# Patient Record
Sex: MALE | Race: WHITE | NOT HISPANIC OR LATINO | Employment: FULL TIME | URBAN - METROPOLITAN AREA
[De-identification: names, ages, dates, MRNs, and addresses within clinical notes are randomized per-mention and may not be internally consistent; named-entity substitution may affect disease eponyms.]

---

## 2017-01-30 ENCOUNTER — APPOINTMENT (OUTPATIENT)
Dept: LAB | Facility: CLINIC | Age: 52
End: 2017-01-30
Payer: COMMERCIAL

## 2017-01-30 ENCOUNTER — GENERIC CONVERSION - ENCOUNTER (OUTPATIENT)
Dept: OTHER | Facility: OTHER | Age: 52
End: 2017-01-30

## 2017-01-30 DIAGNOSIS — E03.9 HYPOTHYROIDISM: ICD-10-CM

## 2017-01-30 LAB — TSH SERPL DL<=0.05 MIU/L-ACNC: 3.68 UIU/ML (ref 0.36–3.74)

## 2017-01-30 PROCEDURE — 84443 ASSAY THYROID STIM HORMONE: CPT

## 2017-01-30 PROCEDURE — 36415 COLL VENOUS BLD VENIPUNCTURE: CPT

## 2018-01-11 NOTE — RESULT NOTES
Verified Results  (1) TSH [id number] 10:03AM Ileana [facility] Order Number: [id number]     Test Name Result Flag Reference   TSH 6 280 uIU/mL H 0 358-3 740   - Patient Instructions: This bloodwork is non-fasting  Please drink two glasses of water morning of bloodwork  - Patient Instructions: This bloodwork is non-fasting  Please drink two glasses of water morning of bloodwork  Patients undergoing fluorescein dye angiography may retain small amounts of fluorescein in the body for 48-72 hours post procedure  Samples containing fluorescein can produce falsely depressed TSH values  If the patient had this procedure,a specimen should be resubmitted post fluorescein clearance  Plan  Acquired hypothyroidism    · From  Levothyroxine Sodium 25 MCG Oral Tablet TAKE 1 TABLET DAILY To  Levothyroxine Sodium 50 MCG Oral Tablet TAKE 1 TABLET DAILY   · (1) TSH; Status:Active;  Requested for:29Nov2016;

## 2018-01-16 NOTE — RESULT NOTES
Verified Results  (1) TSH [id number] 11:16AM Stephanie Rizzo Order Number: [id number]     Test Name Result Flag Reference   TSH 3 680 uIU/mL  0 358-3 740   - Patient Instructions: This bloodwork is non-fasting  Please drink two glasses of water morning of bloodwork  - Patient Instructions: This bloodwork is non-fasting  Please drink two glasses of water morning of bloodwork  Patients undergoing fluorescein dye angiography may retain small amounts of fluorescein in the body for 48-72 hours post procedure  Samples containing fluorescein can produce falsely depressed TSH values  If the patient had this procedure,a specimen should be resubmitted post fluorescein clearance

## 2018-03-28 DIAGNOSIS — E03.9 HYPOTHYROIDISM, UNSPECIFIED TYPE: Primary | ICD-10-CM

## 2018-03-28 RX ORDER — LEVOTHYROXINE SODIUM 0.05 MG/1
1 TABLET ORAL DAILY
COMMUNITY
Start: 2016-10-07 | End: 2018-03-28 | Stop reason: SDUPTHER

## 2018-03-29 RX ORDER — LEVOTHYROXINE SODIUM 0.05 MG/1
50 TABLET ORAL DAILY
Qty: 30 TABLET | Refills: 5 | Status: SHIPPED | OUTPATIENT
Start: 2018-03-29 | End: 2018-08-12 | Stop reason: SDUPTHER

## 2018-05-22 ENCOUNTER — OFFICE VISIT (OUTPATIENT)
Dept: FAMILY MEDICINE CLINIC | Facility: CLINIC | Age: 53
End: 2018-05-22
Payer: COMMERCIAL

## 2018-05-22 VITALS
SYSTOLIC BLOOD PRESSURE: 112 MMHG | DIASTOLIC BLOOD PRESSURE: 74 MMHG | HEIGHT: 72 IN | RESPIRATION RATE: 16 BRPM | TEMPERATURE: 98.2 F | HEART RATE: 60 BPM | WEIGHT: 220 LBS | BODY MASS INDEX: 29.8 KG/M2

## 2018-05-22 DIAGNOSIS — R79.89 LOW TESTOSTERONE: ICD-10-CM

## 2018-05-22 DIAGNOSIS — J30.1 SEASONAL ALLERGIC RHINITIS DUE TO POLLEN: ICD-10-CM

## 2018-05-22 DIAGNOSIS — E03.9 ACQUIRED HYPOTHYROIDISM: Primary | ICD-10-CM

## 2018-05-22 DIAGNOSIS — Z13.1 SCREENING FOR DIABETES MELLITUS: ICD-10-CM

## 2018-05-22 DIAGNOSIS — Z12.11 SCREENING FOR COLON CANCER: ICD-10-CM

## 2018-05-22 DIAGNOSIS — Z12.5 SCREENING FOR PROSTATE CANCER: ICD-10-CM

## 2018-05-22 DIAGNOSIS — E03.9 HYPOTHYROIDISM, UNSPECIFIED TYPE: ICD-10-CM

## 2018-05-22 DIAGNOSIS — Z13.220 SCREENING FOR HYPERLIPIDEMIA: ICD-10-CM

## 2018-05-22 DIAGNOSIS — Z13.0 SCREENING FOR DEFICIENCY ANEMIA: ICD-10-CM

## 2018-05-22 DIAGNOSIS — K58.2 IRRITABLE BOWEL SYNDROME WITH BOTH CONSTIPATION AND DIARRHEA: ICD-10-CM

## 2018-05-22 PROCEDURE — 3008F BODY MASS INDEX DOCD: CPT | Performed by: INTERNAL MEDICINE

## 2018-05-22 PROCEDURE — 1036F TOBACCO NON-USER: CPT | Performed by: INTERNAL MEDICINE

## 2018-05-22 PROCEDURE — 99214 OFFICE O/P EST MOD 30 MIN: CPT | Performed by: INTERNAL MEDICINE

## 2018-05-22 NOTE — PROGRESS NOTES
Assessment/Plan:       Diagnoses and all orders for this visit:    Acquired hypothyroidism    Low testosterone    Irritable bowel syndrome with both constipation and diarrhea    Seasonal allergic rhinitis due to pollen    Screening for deficiency anemia  -     CBC and differential; Future    Screening for diabetes mellitus  -     Comprehensive metabolic panel; Future    Screening for hyperlipidemia  -     Lipid panel; Future    Hypothyroidism, unspecified type  -     TSH, 3rd generation with T4 reflex; Future    Screening for prostate cancer  -     PSA, total and free; Future    Screening for colon cancer  -     Ambulatory referral to Gastroenterology; Future        No problem-specific Assessment & Plan notes found for this encounter  We will followup in 6 months    Subjective:      Patient ID: Viraj Tinoco is a 48 y o  male  The patient was seen and examined and noted to have issues with IBS with mixed symptoms  He states that he has no tenesmus type symptoms and notes no issues with food intolerance, but states that he has alternating constipation and diarrhea  The patient states that he has no issues with milk/dairy  There are no other issues at this time  We do need to follow up with a colonoscopy  The patient also needs a follow up on his TSH  He notes no issues with allergic rhinitis and he is only taking an episodic antihistamine  The following portions of the patient's history were reviewed and updated as appropriate:   He has a past medical history of Chronic calculous cholecystitis  ,   does not have any pertinent problems on file  ,   has a past surgical history that includes CHOLECYSTECTOMY LAPAROSCOPIC (10/23/2014); Hernia repair; Vasectomy; and Tonsillectomy  ,  family history includes Colon cancer in his father; Hyperlipidemia in his father; No Known Problems in his mother  ,   reports that he has never smoked   He has never used smokeless tobacco  He reports that he does not drink alcohol or use drugs  ,  is allergic to tramadol     Current Outpatient Prescriptions   Medication Sig Dispense Refill    levothyroxine 50 mcg tablet Take 1 tablet (50 mcg total) by mouth daily 30 tablet 5     No current facility-administered medications for this visit  Review of Systems   Constitutional: Negative for chills and fever  HENT: Negative for ear pain, sinus pain and sinus pressure  Eyes: Negative for pain  Respiratory: Negative for choking and shortness of breath  Cardiovascular: Negative for chest pain and leg swelling  Gastrointestinal: Positive for constipation and diarrhea  Negative for abdominal distention, abdominal pain, blood in stool, nausea and vomiting  Genitourinary: Negative  Musculoskeletal: Negative  Neurological: Negative  Psychiatric/Behavioral: Negative  Objective:  Vitals:    05/22/18 1315   BP: 112/74   BP Location: Left arm   Patient Position: Sitting   Cuff Size: Large   Pulse: 60   Resp: 16   Temp: 98 2 °F (36 8 °C)   TempSrc: Tympanic   Weight: 99 8 kg (220 lb)   Height: 5' 11 5" (1 816 m)     Body mass index is 30 26 kg/m²  Physical Exam   Constitutional: He is oriented to person, place, and time  He appears well-developed and well-nourished  HENT:   Head: Normocephalic and atraumatic  Eyes: Conjunctivae and EOM are normal  Pupils are equal, round, and reactive to light  Neck: Normal range of motion  Neck supple  Cardiovascular: Normal rate, regular rhythm, normal heart sounds and intact distal pulses  Pulmonary/Chest: Effort normal and breath sounds normal    Abdominal: Soft  Bowel sounds are normal    Musculoskeletal: Normal range of motion  Neurological: He is alert and oriented to person, place, and time  He has normal reflexes  Skin: Skin is warm and dry  Psychiatric: He has a normal mood and affect

## 2018-08-10 ENCOUNTER — TELEPHONE (OUTPATIENT)
Dept: INTERNAL MEDICINE CLINIC | Facility: CLINIC | Age: 53
End: 2018-08-10

## 2018-08-12 DIAGNOSIS — E03.9 HYPOTHYROIDISM, UNSPECIFIED TYPE: ICD-10-CM

## 2018-08-12 RX ORDER — LEVOTHYROXINE SODIUM 0.07 MG/1
75 TABLET ORAL DAILY
Qty: 30 TABLET | Refills: 1 | Status: SHIPPED | OUTPATIENT
Start: 2018-08-12 | End: 2018-08-14 | Stop reason: SDUPTHER

## 2018-08-13 NOTE — TELEPHONE ENCOUNTER
PT called and would like to know what you are concerned about with his labs  He would like to talk with you and see if you can go over this on the phone    Stated he live 1/1/2 hrs away    But if you really need him to come in he will    Please advise    Phone Cell [phone]

## 2018-08-14 DIAGNOSIS — E03.9 HYPOTHYROIDISM, UNSPECIFIED TYPE: ICD-10-CM

## 2018-08-14 DIAGNOSIS — R79.89 ABNORMAL THYROID BLOOD TEST: Primary | ICD-10-CM

## 2018-08-14 NOTE — TELEPHONE ENCOUNTER
TSH is elevated and we need to increase the Levothyroxine  Where can we send that to  He will need to follow up with a TSH in 6 weeks  All other issues schedule follow up at patient convenience

## 2018-08-23 RX ORDER — LEVOTHYROXINE SODIUM 0.1 MG/1
100 TABLET ORAL DAILY
Qty: 30 TABLET | Refills: 1 | Status: SHIPPED | OUTPATIENT
Start: 2018-08-23 | End: 2019-02-25 | Stop reason: SDUPTHER

## 2018-10-20 DIAGNOSIS — E03.9 HYPOTHYROIDISM, UNSPECIFIED TYPE: ICD-10-CM

## 2018-10-20 LAB — TSH SERPL-ACNC: 2.29 MIU/L (ref 0.4–4.5)

## 2018-10-22 RX ORDER — LEVOTHYROXINE SODIUM 0.07 MG/1
TABLET ORAL
Qty: 30 TABLET | Refills: 1 | Status: SHIPPED | OUTPATIENT
Start: 2018-10-22 | End: 2018-12-21 | Stop reason: SDUPTHER

## 2018-12-21 DIAGNOSIS — E03.9 HYPOTHYROIDISM, UNSPECIFIED TYPE: ICD-10-CM

## 2018-12-21 RX ORDER — LEVOTHYROXINE SODIUM 0.07 MG/1
TABLET ORAL
Qty: 30 TABLET | Refills: 1 | Status: SHIPPED | OUTPATIENT
Start: 2018-12-21 | End: 2019-02-28 | Stop reason: SDUPTHER

## 2019-02-25 DIAGNOSIS — E03.9 HYPOTHYROIDISM, UNSPECIFIED TYPE: ICD-10-CM

## 2019-02-26 RX ORDER — LEVOTHYROXINE SODIUM 0.1 MG/1
100 TABLET ORAL DAILY
Qty: 30 TABLET | Refills: 1 | Status: SHIPPED | OUTPATIENT
Start: 2019-02-26 | End: 2019-02-28

## 2019-02-28 DIAGNOSIS — E03.9 HYPOTHYROIDISM, UNSPECIFIED TYPE: ICD-10-CM

## 2019-02-28 RX ORDER — LEVOTHYROXINE SODIUM 0.07 MG/1
75 TABLET ORAL DAILY
Qty: 30 TABLET | Refills: 5 | Status: SHIPPED | OUTPATIENT
Start: 2019-02-28 | End: 2019-03-04 | Stop reason: SDUPTHER

## 2019-03-04 DIAGNOSIS — E03.9 HYPOTHYROIDISM, UNSPECIFIED TYPE: ICD-10-CM

## 2019-03-04 RX ORDER — LEVOTHYROXINE SODIUM 0.07 MG/1
75 TABLET ORAL DAILY
Qty: 90 TABLET | Refills: 1 | Status: SHIPPED | OUTPATIENT
Start: 2019-03-04 | End: 2020-03-03 | Stop reason: SDUPTHER

## 2019-07-19 LAB — TSH SERPL-ACNC: 2.47 MIU/L (ref 0.4–4.5)

## 2019-07-30 ENCOUNTER — OFFICE VISIT (OUTPATIENT)
Dept: INTERNAL MEDICINE CLINIC | Facility: CLINIC | Age: 54
End: 2019-07-30
Payer: COMMERCIAL

## 2019-07-30 VITALS
BODY MASS INDEX: 30.99 KG/M2 | SYSTOLIC BLOOD PRESSURE: 118 MMHG | DIASTOLIC BLOOD PRESSURE: 76 MMHG | HEART RATE: 98 BPM | OXYGEN SATURATION: 96 % | WEIGHT: 221.38 LBS | TEMPERATURE: 98.7 F | HEIGHT: 71 IN

## 2019-07-30 DIAGNOSIS — E03.9 ACQUIRED HYPOTHYROIDISM: Primary | ICD-10-CM

## 2019-07-30 DIAGNOSIS — Z13.0 SCREENING FOR DEFICIENCY ANEMIA: ICD-10-CM

## 2019-07-30 DIAGNOSIS — F32.1 CURRENT MODERATE EPISODE OF MAJOR DEPRESSIVE DISORDER WITHOUT PRIOR EPISODE (HCC): ICD-10-CM

## 2019-07-30 DIAGNOSIS — Z13.220 SCREENING FOR HYPERLIPIDEMIA: ICD-10-CM

## 2019-07-30 DIAGNOSIS — Z11.59 NEED FOR HEPATITIS C SCREENING TEST: ICD-10-CM

## 2019-07-30 DIAGNOSIS — Z13.1 SCREENING FOR DIABETES MELLITUS: ICD-10-CM

## 2019-07-30 PROBLEM — F32.9 CURRENT EPISODE OF MAJOR DEPRESSIVE DISORDER WITHOUT PRIOR EPISODE: Status: ACTIVE | Noted: 2019-07-30

## 2019-07-30 PROCEDURE — 3008F BODY MASS INDEX DOCD: CPT | Performed by: INTERNAL MEDICINE

## 2019-07-30 PROCEDURE — 99214 OFFICE O/P EST MOD 30 MIN: CPT | Performed by: INTERNAL MEDICINE

## 2019-07-30 RX ORDER — SERTRALINE HYDROCHLORIDE 25 MG/1
25 TABLET, FILM COATED ORAL DAILY
Qty: 30 TABLET | Refills: 5 | Status: SHIPPED | OUTPATIENT
Start: 2019-07-30 | End: 2019-09-10

## 2019-07-30 NOTE — PROGRESS NOTES
Assessment/Plan:       Diagnoses and all orders for this visit:    Acquired hypothyroidism  -     TSH, 3rd generation    Screening for hyperlipidemia  -     Lipid Panel with Direct LDL reflex    Screening for diabetes mellitus  -     Comprehensive metabolic panel    Screening for deficiency anemia  -     CBC and differential    Need for hepatitis C screening test  -     Hepatitis C antibody    Current moderate episode of major depressive disorder without prior episode (HCC)  -     sertraline (ZOLOFT) 25 mg tablet; Take 1 tablet (25 mg total) by mouth daily        No problem-specific Assessment & Plan notes found for this encounter  We will follow up in one month    Subjective:      Patient ID: Nilda Mauro is a 47 y o  male  The patient notes issues with down mood and irritability  He notes loss of interest and anhedonias  The patient states that he has issues with maintaining sleep  Depression   This is a chronic problem  The current episode started more than 1 month ago  The problem occurs constantly  The problem has been waxing and waning  Pertinent negatives include no abdominal pain, anorexia, arthralgias, change in bowel habit, chest pain, chills, congestion, coughing, diaphoresis, fatigue, fever, headaches, joint swelling, myalgias, nausea, neck pain, numbness, rash, sore throat, swollen glands, urinary symptoms, vertigo, visual change, vomiting or weakness  The symptoms are aggravated by stress  He has tried nothing for the symptoms  The following portions of the patient's history were reviewed and updated as appropriate:   He has a past medical history of Chronic calculous cholecystitis  ,  does not have any pertinent problems on file  ,   has a past surgical history that includes CHOLECYSTECTOMY LAPAROSCOPIC (10/23/2014); Hernia repair; Vasectomy; and Tonsillectomy  ,  family history includes Colon cancer in his father; Hyperlipidemia in his father; No Known Problems in his mother  , reports that he has never smoked  He has never used smokeless tobacco  He reports that he does not drink alcohol or use drugs  ,  is allergic to tramadol     Current Outpatient Medications   Medication Sig Dispense Refill    levothyroxine 75 mcg tablet Take 1 tablet (75 mcg total) by mouth daily 90 tablet 1    sertraline (ZOLOFT) 25 mg tablet Take 1 tablet (25 mg total) by mouth daily 30 tablet 5     No current facility-administered medications for this visit  Review of Systems   Constitutional: Negative for chills, diaphoresis, fatigue and fever  HENT: Negative for congestion and sore throat  Respiratory: Negative for cough  Cardiovascular: Negative for chest pain  Gastrointestinal: Negative for abdominal pain, anorexia, change in bowel habit, nausea and vomiting  Musculoskeletal: Negative for arthralgias, joint swelling, myalgias and neck pain  Skin: Negative for rash  Neurological: Negative for vertigo, weakness, numbness and headaches  Psychiatric/Behavioral: Positive for depression  Objective:  Vitals:    07/30/19 0908   BP: 118/76   Pulse: 98   Temp: 98 7 °F (37 1 °C)   SpO2: 96%   Weight: 100 kg (221 lb 6 oz)   Height: 5' 11" (1 803 m)     Body mass index is 30 88 kg/m²  Physical Exam   Constitutional: He is oriented to person, place, and time  He appears well-developed and well-nourished  HENT:   Head: Normocephalic and atraumatic  Eyes: Pupils are equal, round, and reactive to light  Neck: Normal range of motion  Cardiovascular: Normal rate, regular rhythm, normal heart sounds and intact distal pulses  Exam reveals no gallop  No murmur heard  Pulmonary/Chest: Effort normal and breath sounds normal  He has no wheezes  He has no rales  Abdominal: Soft  Bowel sounds are normal  He exhibits no mass  There is no tenderness  There is no rebound  Musculoskeletal: Normal range of motion  He exhibits no edema or tenderness     Neurological: He is alert and oriented to person, place, and time  Coordination normal    Skin: Skin is warm and dry  Psychiatric: He has a normal mood and affect  His speech is normal and behavior is normal  Judgment and thought content normal  Cognition and memory are normal    Nursing note and vitals reviewed

## 2019-09-04 LAB
ALBUMIN SERPL-MCNC: 4 G/DL (ref 3.6–5.1)
ALBUMIN/GLOB SERPL: 1.6 (CALC) (ref 1–2.5)
ALP SERPL-CCNC: 57 U/L (ref 40–115)
ALT SERPL-CCNC: 24 U/L (ref 9–46)
AST SERPL-CCNC: 20 U/L (ref 10–35)
BASOPHILS # BLD AUTO: 69 CELLS/UL (ref 0–200)
BASOPHILS NFR BLD AUTO: 1.6 %
BILIRUB SERPL-MCNC: 0.5 MG/DL (ref 0.2–1.2)
BUN SERPL-MCNC: 27 MG/DL (ref 7–25)
BUN/CREAT SERPL: 21 (CALC) (ref 6–22)
CALCIUM SERPL-MCNC: 8.8 MG/DL (ref 8.6–10.3)
CHLORIDE SERPL-SCNC: 104 MMOL/L (ref 98–110)
CHOLEST SERPL-MCNC: 212 MG/DL
CHOLEST/HDLC SERPL: 4.6 (CALC)
CO2 SERPL-SCNC: 27 MMOL/L (ref 20–32)
CREAT SERPL-MCNC: 1.26 MG/DL (ref 0.7–1.33)
EOSINOPHIL # BLD AUTO: 159 CELLS/UL (ref 15–500)
EOSINOPHIL NFR BLD AUTO: 3.7 %
ERYTHROCYTE [DISTWIDTH] IN BLOOD BY AUTOMATED COUNT: 12.3 % (ref 11–15)
GLOBULIN SER CALC-MCNC: 2.5 G/DL (CALC) (ref 1.9–3.7)
GLUCOSE SERPL-MCNC: 91 MG/DL (ref 65–99)
HCT VFR BLD AUTO: 43.6 % (ref 38.5–50)
HCV AB S/CO SERPL IA: 0.01
HCV AB SERPL QL IA: NORMAL
HDLC SERPL-MCNC: 46 MG/DL
HGB BLD-MCNC: 14.3 G/DL (ref 13.2–17.1)
LDLC SERPL CALC-MCNC: 146 MG/DL (CALC)
LYMPHOCYTES # BLD AUTO: 1445 CELLS/UL (ref 850–3900)
LYMPHOCYTES NFR BLD AUTO: 33.6 %
MCH RBC QN AUTO: 27.8 PG (ref 27–33)
MCHC RBC AUTO-ENTMCNC: 32.8 G/DL (ref 32–36)
MCV RBC AUTO: 84.8 FL (ref 80–100)
MONOCYTES # BLD AUTO: 546 CELLS/UL (ref 200–950)
MONOCYTES NFR BLD AUTO: 12.7 %
NEUTROPHILS # BLD AUTO: 2081 CELLS/UL (ref 1500–7800)
NEUTROPHILS NFR BLD AUTO: 48.4 %
NONHDLC SERPL-MCNC: 166 MG/DL (CALC)
PLATELET # BLD AUTO: 154 THOUSAND/UL (ref 140–400)
PMV BLD REES-ECKER: 10.6 FL (ref 7.5–12.5)
POTASSIUM SERPL-SCNC: 4.2 MMOL/L (ref 3.5–5.3)
PROT SERPL-MCNC: 6.5 G/DL (ref 6.1–8.1)
RBC # BLD AUTO: 5.14 MILLION/UL (ref 4.2–5.8)
SL AMB EGFR AFRICAN AMERICAN: 74 ML/MIN/1.73M2
SL AMB EGFR NON AFRICAN AMERICAN: 64 ML/MIN/1.73M2
SODIUM SERPL-SCNC: 137 MMOL/L (ref 135–146)
TRIGL SERPL-MCNC: 94 MG/DL
TSH SERPL-ACNC: 4.04 MIU/L (ref 0.4–4.5)
WBC # BLD AUTO: 4.3 THOUSAND/UL (ref 3.8–10.8)

## 2019-09-10 ENCOUNTER — OFFICE VISIT (OUTPATIENT)
Dept: INTERNAL MEDICINE CLINIC | Facility: CLINIC | Age: 54
End: 2019-09-10
Payer: COMMERCIAL

## 2019-09-10 VITALS
OXYGEN SATURATION: 96 % | BODY MASS INDEX: 31.25 KG/M2 | DIASTOLIC BLOOD PRESSURE: 82 MMHG | HEIGHT: 71 IN | SYSTOLIC BLOOD PRESSURE: 124 MMHG | WEIGHT: 223.2 LBS | HEART RATE: 65 BPM | TEMPERATURE: 97.9 F | RESPIRATION RATE: 16 BRPM

## 2019-09-10 DIAGNOSIS — E03.9 ACQUIRED HYPOTHYROIDISM: ICD-10-CM

## 2019-09-10 DIAGNOSIS — F32.0 CURRENT MILD EPISODE OF MAJOR DEPRESSIVE DISORDER WITHOUT PRIOR EPISODE (HCC): ICD-10-CM

## 2019-09-10 DIAGNOSIS — E78.00 HYPERCHOLESTEROLEMIA: Primary | ICD-10-CM

## 2019-09-10 PROCEDURE — 99214 OFFICE O/P EST MOD 30 MIN: CPT | Performed by: INTERNAL MEDICINE

## 2019-09-10 PROCEDURE — 3008F BODY MASS INDEX DOCD: CPT | Performed by: INTERNAL MEDICINE

## 2019-09-10 NOTE — PROGRESS NOTES
Assessment/Plan:    Problem List Items Addressed This Visit        Endocrine    Acquired hypothyroidism       Other    Current episode of major depressive disorder without prior episode      Other Visit Diagnoses     Hypercholesterolemia    -  Primary    Relevant Orders    LDL cholesterol, direct           Diagnoses and all orders for this visit:    Hypercholesterolemia  -     LDL cholesterol, direct; Future    Acquired hypothyroidism    Current mild episode of major depressive disorder without prior episode ([address] )        No problem-specific Assessment & Plan notes found for this encounter  Subjective:      Patient ID: Gabe Hooper is a 47 y o  male  We reviewed the patient's labs and noted that his LDL cholesterol was noted to be elevated  The patient states that he is on the Atkins diet  I noted that a diet that was low in animal products and aerobic exercise might help this  The patient states that he would try some changes and he would follow up on the LDL in 3-4 months  I noted that he could also try a low Red Yeast Rice Extract  The patient notes that he is resistant to taking the Sertraline for his mood and would like to try something more Homeopathic  I noted that he could try [employer] and exercise  If needed we could revisit the Sertraline if felt this had failed  The following portions of the patient's history were reviewed and updated as appropriate:   He has a past medical history of Chronic calculous cholecystitis and Depression  ,  does not have any pertinent problems on file  ,   has a past surgical history that includes CHOLECYSTECTOMY LAPAROSCOPIC (10/23/2014); Hernia repair; Vasectomy; and Tonsillectomy  ,  family history includes Colon cancer in his father; Hyperlipidemia in his father; No Known Problems in his mother  ,   reports that he has never smoked  He has never used smokeless tobacco  He reports that he does not drink alcohol or use drugs  ,  is allergic to tramadol     Current Outpatient Medications   Medication Sig Dispense Refill    levothyroxine 75 mcg tablet Take 1 tablet (75 mcg total) by mouth daily 90 tablet 1     No current facility-administered medications for this visit  Review of Systems   Constitutional: Negative for chills, fatigue and fever  HENT: Negative  Respiratory: Negative for cough, chest tightness and shortness of breath  Cardiovascular: Negative for chest pain and palpitations  Gastrointestinal: Negative for abdominal pain, constipation, diarrhea, nausea and vomiting  Genitourinary: Negative  Musculoskeletal: Negative for back pain and myalgias  Skin: Negative  Neurological: Negative  Psychiatric/Behavioral: Negative for dysphoric mood  The patient is not nervous/anxious  Objective:  Vitals:    09/10/19 1425   BP: 124/82   BP Location: Left arm   Patient Position: Sitting   Cuff Size: Large   Pulse: 65   Resp: 16   Temp: 97 9 °F (36 6 °C)   SpO2: 96%   Weight: 101 kg (223 lb 3 2 oz)   Height: 5' 11" (1 803 m)     Body mass index is 31 13 kg/m²  Physical Exam   Constitutional: He is oriented to person, place, and time  He appears well-developed and well-nourished  HENT:   Head: Normocephalic and atraumatic  Eyes: Pupils are equal, round, and reactive to light  EOM are normal    Neck: Normal range of motion  Neck supple  Cardiovascular: Normal rate, regular rhythm, normal heart sounds and intact distal pulses  No murmur heard  Pulmonary/Chest: Effort normal and breath sounds normal  No stridor  He has no rales  Abdominal: Soft  Bowel sounds are normal  He exhibits no distension  There is no tenderness  Musculoskeletal: Normal range of motion  He exhibits no edema or deformity  Neurological: He is alert and oriented to person, place, and time  Skin: Skin is warm and dry  Psychiatric: He has a normal mood and affect  Nursing note and vitals reviewed         PHQ-9 Depression Screening PHQ-9:    Frequency of the following problems over the past two weeks:       Little interest or pleasure in doing things:  0 - not at all  Feeling down, depressed, or hopeless:  0 - not at all  Trouble falling or staying asleep, or sleeping too much:  0 - not at all  Feeling tired or having little energy:  0 - not at all  Poor appetite or overeatin - not at all  Feeling bad about yourself - or that you are a failure or have let yourself or your family down:  0 - not at all  Trouble concentrating on things, such as reading the newspaper or watching television:  0 - not at all  Moving or speaking so slowly that other people could have noticed   Or the opposite - being so fidgety or restless that you have been moving around a lot more than usual:  0 - not at all  Thoughts that you would be better off dead, or of hurting yourself in some way:  0 - not at all  PHQ-2 Score:  0  PHQ-9 Score:  0

## 2019-12-12 DIAGNOSIS — E03.9 HYPOTHYROIDISM, UNSPECIFIED TYPE: ICD-10-CM

## 2019-12-12 RX ORDER — LEVOTHYROXINE SODIUM 0.07 MG/1
TABLET ORAL
Qty: 90 TABLET | Refills: 1 | Status: SHIPPED | OUTPATIENT
Start: 2019-12-12

## 2020-03-03 DIAGNOSIS — E03.9 HYPOTHYROIDISM, UNSPECIFIED TYPE: ICD-10-CM

## 2020-03-03 RX ORDER — LEVOTHYROXINE SODIUM 0.07 MG/1
75 TABLET ORAL DAILY
Qty: 90 TABLET | Refills: 1 | Status: SHIPPED | OUTPATIENT
Start: 2020-03-03 | End: 2020-07-29

## 2020-07-28 DIAGNOSIS — E03.9 HYPOTHYROIDISM, UNSPECIFIED TYPE: ICD-10-CM

## 2020-07-29 RX ORDER — LEVOTHYROXINE SODIUM 0.07 MG/1
TABLET ORAL
Qty: 90 TABLET | Refills: 0 | Status: SHIPPED | OUTPATIENT
Start: 2020-07-29